# Patient Record
Sex: MALE | Race: BLACK OR AFRICAN AMERICAN | NOT HISPANIC OR LATINO | URBAN - METROPOLITAN AREA
[De-identification: names, ages, dates, MRNs, and addresses within clinical notes are randomized per-mention and may not be internally consistent; named-entity substitution may affect disease eponyms.]

---

## 2024-03-29 ENCOUNTER — EMERGENCY (EMERGENCY)
Facility: HOSPITAL | Age: 59
LOS: 1 days | Discharge: HOME | End: 2024-03-29
Attending: STUDENT IN AN ORGANIZED HEALTH CARE EDUCATION/TRAINING PROGRAM | Admitting: STUDENT IN AN ORGANIZED HEALTH CARE EDUCATION/TRAINING PROGRAM
Payer: SELF-PAY

## 2024-03-29 VITALS
TEMPERATURE: 98 F | DIASTOLIC BLOOD PRESSURE: 74 MMHG | OXYGEN SATURATION: 98 % | SYSTOLIC BLOOD PRESSURE: 137 MMHG | RESPIRATION RATE: 18 BRPM | HEART RATE: 84 BPM

## 2024-03-29 PROCEDURE — 99284 EMERGENCY DEPT VISIT MOD MDM: CPT | Mod: 25

## 2024-03-29 PROCEDURE — 99284 EMERGENCY DEPT VISIT MOD MDM: CPT

## 2024-03-29 PROCEDURE — 71045 X-RAY EXAM CHEST 1 VIEW: CPT

## 2024-03-29 PROCEDURE — 71045 X-RAY EXAM CHEST 1 VIEW: CPT | Mod: 26

## 2024-03-29 PROCEDURE — 96372 THER/PROPH/DIAG INJ SC/IM: CPT

## 2024-03-29 RX ORDER — DIPHENHYDRAMINE HCL 50 MG
50 CAPSULE ORAL ONCE
Refills: 0 | Status: DISCONTINUED | OUTPATIENT
Start: 2024-03-29 | End: 2024-03-29

## 2024-03-29 RX ORDER — DIPHENHYDRAMINE HCL 50 MG
50 CAPSULE ORAL ONCE
Refills: 0 | Status: COMPLETED | OUTPATIENT
Start: 2024-03-29 | End: 2024-03-29

## 2024-03-29 RX ORDER — HYDROMORPHONE HYDROCHLORIDE 2 MG/ML
4 INJECTION INTRAMUSCULAR; INTRAVENOUS; SUBCUTANEOUS ONCE
Refills: 0 | Status: DISCONTINUED | OUTPATIENT
Start: 2024-03-29 | End: 2024-03-29

## 2024-03-29 RX ADMIN — HYDROMORPHONE HYDROCHLORIDE 4 MILLIGRAM(S): 2 INJECTION INTRAMUSCULAR; INTRAVENOUS; SUBCUTANEOUS at 15:24

## 2024-03-29 RX ADMIN — Medication 50 MILLIGRAM(S): at 15:45

## 2024-03-29 NOTE — ED PROVIDER NOTE - OBJECTIVE STATEMENT
59 year old male with history of sickle cell disease, lung CA?, presenting with diffuse body pain. States that he is having a sickle cell flare, was unable to fill his home PO dilaudid due to "money issues", [facility] but is visiting his aunt in [facility] currently and could not tolerate the pain. Denies fevers, chills, sob, night sweats, unintentional weight loss. Also recently punched a wall with his R hand, has swelling and pain there but denies numbness, weakness, wound.

## 2024-03-29 NOTE — ED ADULT NURSE NOTE - OBJECTIVE STATEMENT
60 y/o male c/o generalized body pain. stated " he has sickle cell and has pain everywhere, requesting Dilaudid and benadryl " also stated " his uncle has TB and he needs to be checked for" denies fever, chills, cough, night sweats. also requesting, lotion, cream for his lips, band-aids, Alchohol pads, crackers and water.

## 2024-03-29 NOTE — ED PROVIDER NOTE - PATIENT PORTAL LINK FT
You can access the FollowMyHealth Patient Portal offered by [facility] by registering at the following website: [email or web address]. By joining [employer]’s FollowMyHealth portal, you will also be able to view your health information using other applications (apps) compatible with our system.

## 2024-03-29 NOTE — ED PROVIDER NOTE - NSFOLLOWUPINSTRUCTIONS_ED_ALL_ED_FT
You were seen in the Emergency Department for: chest pain    Please follow up with your primary physician. If you do not have a primary physician or specialist of your needs, please call [phone] to find one convenient for you. At this number you will be able to locate a provider who accepts your insurance, as well as locate the right specialist for your needs.    You should return to the Emergency Department if you feel any new/worsening/persistent symptoms including but not limited to: fever, chills, vomiting, chest pain, difficulty breathing, loss of consciousness, bleeding, uncontrolled pain, numbness/weakness of a body part You were seen in the Emergency Department for: sickle cell pain    Please follow up with your primary physician. If you do not have a primary physician or specialist of your needs, please call [phone] to find one convenient for you. At this number you will be able to locate a provider who accepts your insurance, as well as locate the right specialist for your needs.    You should return to the Emergency Department if you feel any new/worsening/persistent symptoms including but not limited to: fever, chills, vomiting, chest pain, difficulty breathing, loss of consciousness, bleeding, uncontrolled pain, numbness/weakness of a body part

## 2024-03-29 NOTE — ED PROVIDER NOTE - CLINICAL SUMMARY MEDICAL DECISION MAKING FREE TEXT BOX
59 year old male with history of sickle cell disease, lung CA?, presenting with diffuse body pain 2/2 sickle cell vasoocclusive pain crisis. Well appearing overall here with good vitals/afebrile. Despite patient's report of TB contact--low suspicion for TB--does not have any constitutional symptoms, night sweats, weight loss, fever. Will obtain CXR to further assess. Patient declines XR imaging of his R hand despite recent trauma and pain/swelling noted on exam. EKG is nsr nonischemic, low suspicion for ACS. Patient presenting with prescription for PO 4mg dilaudid--was unable to fill due to lack of money. Requesting IM dose as his pain is severe at this moment due to VOC. Will give one time dose, obtain CXR to assess for infection, otherwise DC [facility]. No indication for labs/inpatient management.

## 2024-03-29 NOTE — ED ADULT TRIAGE NOTE - CHIEF COMPLAINT QUOTE
Patient PMH cardiac stents x 2 to the ED c/o chest pain with cough x 2 hours, "I have been exposed to TB, I want a test". AAOx4, NAD. Patient PMH cardiac stents x 2 to the ED c/o chest pain with cough x 2 hours, "I have been exposed to TB, I want a test". Patient also c/o right hand pain, refusing to state DUNG. Unable to visualize patient's face due to refusing to take off facial cover/sunglasses. AAOx4, NAD.

## 2024-03-29 NOTE — ED ADULT NURSE NOTE - CHIEF COMPLAINT QUOTE
Patient PMH cardiac stents x 2 to the ED c/o chest pain with cough x 2 hours, "I have been exposed to TB, I want a test". Patient also c/o right hand pain, refusing to state DUNG. Unable to visualize patient's face due to refusing to take off facial cover/sunglasses. AAOx4, NAD.

## 2024-03-29 NOTE — ED PROVIDER NOTE - PHYSICAL EXAMINATION
Gen - NAD; well-appearing; A+Ox3   HEENT - NCAT, EOMI, moist mucous membranes, clear oropharynx  Neck - supple  Resp - CTAB, no increased WOB  CV -  RRR, no m/r/g  Abd - soft, NT, ND; no guarding or rebound  Back - no midline, paraspinous, or CVA tenderness  MSK - FROM of b/l UE and LE, no gross deformities, swelling to R dorsal knuckles but FROM, NVI distally throughout, soft compartments, no wound  Extrem - no LE edema/erythema/tenderness  Neuro - no focal motor or sensation deficits  Skin - warm, well perfused

## 2024-03-31 DIAGNOSIS — R52 PAIN, UNSPECIFIED: ICD-10-CM

## 2024-03-31 DIAGNOSIS — D57.00 HB-SS DISEASE WITH CRISIS, UNSPECIFIED: ICD-10-CM
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
## 2024-03-31 DIAGNOSIS — M79.641 PAIN IN RIGHT HAND: ICD-10-CM

## 2024-03-31 DIAGNOSIS — Z88.8 ALLERGY STATUS TO OTHER DRUGS, MEDICAMENTS AND BIOLOGICAL SUBSTANCES: ICD-10-CM
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
## 2024-03-31 DIAGNOSIS — Z88.6 ALLERGY STATUS TO ANALGESIC AGENT: ICD-10-CM

## 2024-03-31 DIAGNOSIS — M79.89 OTHER SPECIFIED SOFT TISSUE DISORDERS: ICD-10-CM

## 2024-04-01 ENCOUNTER — EMERGENCY (EMERGENCY)
Facility: HOSPITAL | Age: 59
LOS: 1 days | Discharge: HOME | End: 2024-04-01
Attending: EMERGENCY MEDICINE | Admitting: EMERGENCY MEDICINE
Payer: SELF-PAY

## 2024-04-01 VITALS
TEMPERATURE: 98 F | RESPIRATION RATE: 18 BRPM | HEIGHT: 74 IN | HEART RATE: 100 BPM | DIASTOLIC BLOOD PRESSURE: 78 MMHG | SYSTOLIC BLOOD PRESSURE: 148 MMHG | WEIGHT: 186.07 LBS | OXYGEN SATURATION: 98 %

## 2024-04-01 VITALS
TEMPERATURE: 98 F | SYSTOLIC BLOOD PRESSURE: 140 MMHG | OXYGEN SATURATION: 99 % | DIASTOLIC BLOOD PRESSURE: 74 MMHG | RESPIRATION RATE: 18 BRPM | HEART RATE: 89 BPM

## 2024-04-01 PROCEDURE — 71045 X-RAY EXAM CHEST 1 VIEW: CPT

## 2024-04-01 PROCEDURE — 93005 ELECTROCARDIOGRAM TRACING: CPT

## 2024-04-01 PROCEDURE — 93010 ELECTROCARDIOGRAM REPORT: CPT

## 2024-04-01 PROCEDURE — 99283 EMERGENCY DEPT VISIT LOW MDM: CPT | Mod: 25

## 2024-04-01 PROCEDURE — 96372 THER/PROPH/DIAG INJ SC/IM: CPT

## 2024-04-01 PROCEDURE — 71045 X-RAY EXAM CHEST 1 VIEW: CPT | Mod: 26

## 2024-04-01 PROCEDURE — 99285 EMERGENCY DEPT VISIT HI MDM: CPT

## 2024-04-01 PROCEDURE — 99053 MED SERV 10PM-8AM 24 HR FAC: CPT

## 2024-04-01 RX ORDER — ONDANSETRON 8 MG/1
4 TABLET, FILM COATED ORAL ONCE
Refills: 0 | Status: COMPLETED | OUTPATIENT
Start: 2024-04-01 | End: 2024-04-01

## 2024-04-01 RX ORDER — DIPHENHYDRAMINE HCL 50 MG
50 CAPSULE ORAL ONCE
Refills: 0 | Status: COMPLETED | OUTPATIENT
Start: 2024-04-01 | End: 2024-04-01

## 2024-04-01 RX ORDER — HYDROMORPHONE HYDROCHLORIDE 2 MG/ML
4 INJECTION INTRAMUSCULAR; INTRAVENOUS; SUBCUTANEOUS ONCE
Refills: 0 | Status: DISCONTINUED | OUTPATIENT
Start: 2024-04-01 | End: 2024-04-01

## 2024-04-01 RX ORDER — FAMOTIDINE 10 MG/ML
20 INJECTION INTRAVENOUS ONCE
Refills: 0 | Status: DISCONTINUED | OUTPATIENT
Start: 2024-04-01 | End: 2024-04-01

## 2024-04-01 RX ADMIN — HYDROMORPHONE HYDROCHLORIDE 4 MILLIGRAM(S): 2 INJECTION INTRAMUSCULAR; INTRAVENOUS; SUBCUTANEOUS at 23:19

## 2024-04-01 RX ADMIN — Medication 50 MILLIGRAM(S): at 23:24

## 2024-04-01 RX ADMIN — ONDANSETRON 4 MILLIGRAM(S): 8 TABLET, FILM COATED ORAL at 23:19

## 2024-04-01 NOTE — ED PROVIDER NOTE - CHIEF COMPLAINT
The patient is a 59y Male complaining of chest pain. Glycopyrrolate Counseling:  I discussed with the patient the risks of glycopyrrolate including but not limited to skin rash, drowsiness, dry mouth, difficulty urinating, and blurred vision.

## 2024-04-01 NOTE — ED PROVIDER NOTE - CLINICAL SUMMARY MEDICAL DECISION MAKING FREE TEXT BOX
59-year-old male with drug-seeking behavior and sickle cell disease will give patient his pain medication and DC patient does not want evaluation lab work discussed case with patient and advised that he can return at any time should he develop chest pain shortness of breath or hemoptysis.      EKG normal sinus rhythm 95 normal axis intervals LVH no STEMI   chest x-ray without pneumothorax or pneumonia no acute chest

## 2024-04-01 NOTE — ED PROVIDER NOTE - NSFOLLOWUPCLINICS_GEN_ALL_ED_FT
[facility]  Family Medicine  [address]  [place], [place] [address]  Phone: [phone]  Fax:

## 2024-04-01 NOTE — ED ADULT NURSE NOTE - OBJECTIVE STATEMENT
58 y/o M 60 y/o M  endorses hx of sickle cell disease, lung CA. Pt refusing some questions from RN, refusing IV access. Pt requesting Dilaudid and Benadryl for his sickle cell pain. At this time pt denies chest pain, jaw pain, arm pain, SOB, N/V/D, palpitations, dizziness, headache, lightheadedness. PT A&Ox4, respirations even and unlabored, skin color WDL warm and dry, pt is ambulatory with a steady gait. No acute distress observed. 60 y/o M  endorses hx of sickle cell disease, lung CA. Pt refusing some questions from RN, refusing IV access, refusing blood draw. Pt requesting Dilaudid and Benadryl for his sickle cell pain. At this time pt denies chest pain, jaw pain, arm pain, SOB, N/V/D, palpitations, dizziness, headache, lightheadedness. PT A&Ox4, respirations even and unlabored, skin color WDL warm and dry, pt is ambulatory with a steady gait. No acute distress observed.

## 2024-04-01 NOTE — ED PROVIDER NOTE - PATIENT PORTAL LINK FT
You can access the FollowMyHealth Patient Portal offered by [facility] by registering at the following website: [email or web address]. By joining [employer]’s FollowMyHealth portal, you will also be able to view your health information using other applications (apps) compatible with our system.

## 2024-04-01 NOTE — ED PROVIDER NOTE - OBJECTIVE STATEMENT
59-year-old male self-described sickle cell disease and lung CA has multiple MRN's and names which he gives a different ED throughout the [facility] area here for sickle cell pain body aches initially triaged as chest pain but patient denies chest pain.  I have seen this patient in multiple ED's on multiple occasions and previously was feigning significant injury to get Dilaudid and drug-seeking behavior.  Today patient is states that he needs his Dilaudid and oral Benadryl and  does not want any further workup.  Denies associated chest pain or shortness of breath.  Requesting pain meds and DC.

## 2024-04-01 NOTE — ED ADULT NURSE NOTE - NSFALLUNIVINTERV_ED_ALL_ED
Bed/Stretcher in lowest position, wheels locked, appropriate side rails in place/Call bell, personal items and telephone in reach/Instruct patient to call for assistance before getting out of bed/chair/stretcher/Non-slip footwear applied when patient is off stretcher/[place] to call system/Physically safe environment - no spills, clutter or unnecessary equipment/Purposeful proactive rounding/Room/bathroom lighting operational, light cord in reach

## 2024-04-03 DIAGNOSIS — R52 PAIN, UNSPECIFIED: ICD-10-CM

## 2024-04-03 DIAGNOSIS — Z88.6 ALLERGY STATUS TO ANALGESIC AGENT: ICD-10-CM

## 2024-04-03 DIAGNOSIS — D57.00 HB-SS DISEASE WITH CRISIS, UNSPECIFIED: ICD-10-CM

## 2024-04-03 DIAGNOSIS — Z88.8 ALLERGY STATUS TO OTHER DRUGS, MEDICAMENTS AND BIOLOGICAL SUBSTANCES: ICD-10-CM

## 2024-04-06 ENCOUNTER — EMERGENCY (EMERGENCY)
Facility: HOSPITAL | Age: 59
LOS: 1 days | Discharge: LEFT BEFORE DISCHARGE | End: 2024-04-06
Attending: STUDENT IN AN ORGANIZED HEALTH CARE EDUCATION/TRAINING PROGRAM | Admitting: STUDENT IN AN ORGANIZED HEALTH CARE EDUCATION/TRAINING PROGRAM
Payer: SELF-PAY

## 2024-04-06 VITALS
OXYGEN SATURATION: 100 % | DIASTOLIC BLOOD PRESSURE: 76 MMHG | WEIGHT: 179.9 LBS | TEMPERATURE: 98 F | RESPIRATION RATE: 17 BRPM | HEIGHT: 74 IN | SYSTOLIC BLOOD PRESSURE: 135 MMHG | HEART RATE: 93 BPM

## 2024-04-06 DIAGNOSIS — R07.89 OTHER CHEST PAIN: ICD-10-CM

## 2024-04-06 DIAGNOSIS — Z88.5 ALLERGY STATUS TO NARCOTIC AGENT: ICD-10-CM

## 2024-04-06 DIAGNOSIS — Z53.29 PROCEDURE AND TREATMENT NOT CARRIED OUT BECAUSE OF PATIENT'S DECISION FOR OTHER REASONS: ICD-10-CM

## 2024-04-06 DIAGNOSIS — Z88.8 ALLERGY STATUS TO OTHER DRUGS, MEDICAMENTS AND BIOLOGICAL SUBSTANCES: ICD-10-CM

## 2024-04-06 DIAGNOSIS — Z88.6 ALLERGY STATUS TO ANALGESIC AGENT: ICD-10-CM

## 2024-04-06 PROCEDURE — 99284 EMERGENCY DEPT VISIT MOD MDM: CPT

## 2024-04-06 PROCEDURE — 99282 EMERGENCY DEPT VISIT SF MDM: CPT

## 2024-04-06 NOTE — ED PROVIDER NOTE - OBJECTIVE STATEMENT
59 year old M presenting requesting IV dilaudid. Pt states he has chest pain and thinks it is because he needs IV dilaudid. States it is not related to his heart and he just needs IV dilaudid to help with the pain. Denies any sob. No lightheadedness or dizziness. Discussed with patient that more history is needed and blood work to rule out cardiac etiology, pt states he does not want any blood work and just needs dilaudid. Patient walked out of ER prior to full evaluation.

## 2024-04-06 NOTE — ED ADULT NURSE NOTE - CHIEF COMPLAINT QUOTE
59M PMH MI (+plavix) presents to ED c/o midsternal CP radiating to L jaw associated with mid back pain x2-3 hours and migraine with light sensitivity x4 hours. denies SOB, n/v or weakness/dizziness. EKG in progress. pt speaking in clear, complete sentences. RR easy, even, un-labored on room air. A&Ox4, ambulatory with steady gait using assistive device.

## 2024-04-06 NOTE — ED ADULT NURSE NOTE - OBJECTIVE STATEMENT
59 y.o. Male A/ox4 c/o midsternal chest pain radiating to jaw x4 hours. Requesting Dilaudid medication on arrival. "my chest pain is from my sickle cell". when doctor said he would like to r/o any cardiac emergencies, pt stated [facility] Walked out of ER with steady gait independently. Denies SOB. unlabored even respirations no acute distress. patient educated on importance of ruling out emergencies and risk of leaving without proper assessmnet, pt verbalized understanding, refused care, and left ED. 59 y.o. Male A/ox4 c/o midsternal chest pain radiating to jaw x4 hours. Requesting Dilaudid medication on arrival. "my chest pain is from my sickle cell". when doctor said he would like to r/o any cardiac emergencies, pt stated "[facility]." Walked out of ER with steady gait independently. Denies SOB. unlabored even respirations no acute distress. patient educated on importance of ruling out emergencies and risk of leaving without proper assessmnet, pt verbalized understanding, refused care, and left ED.

## 2024-04-06 NOTE — ED PROVIDER NOTE - PHYSICAL EXAMINATION
general: Well appearing, in no acute distress  HEENT: Normocephalic, atraumatic, extraocular movements intact  CV: Regular rate  Pulm: No respiratory distress, no tachypnea  Abd: Flat, no gross distension  Skin: No gross rashes or lesions  Neuro: Alert and oriented, moving all extremities, ambulating with steady gait

## 2024-04-06 NOTE — ED PROVIDER NOTE - CLINICAL SUMMARY MEDICAL DECISION MAKING FREE TEXT BOX
58 yo M p/w cp, ekg nsr, no stemi. patient refused blood work including trop for acs ro, pt walked out.

## 2024-04-06 NOTE — ED PROVIDER NOTE - NS ED ROS FT
Constitutional: No fever or chills  Eyes: No discharge or drainage  Ears, Nose, Mouth, Throat: No nasal discharge, no sore throat  Cardiovascular: No chest pain, no palpitations  Respiratory: No shortness of breath, no cough  Gastrointestinal: No nausea or vomiting, no abdominal pain, no diarrhea or constipation  Musculoskeletal: No joint pain, no swelling  Skin: No rashes or lesions

## 2024-04-06 NOTE — ED ADULT NURSE NOTE - NSFALLRISKINTERV_ED_ALL_ED

## 2024-04-06 NOTE — ED ADULT TRIAGE NOTE - CHIEF COMPLAINT QUOTE
59M PMH MI (+plavix) presents to ED c/o midsternal CP radiating to L jaw associated with mid back pain x2-3 hours and migraine with light sensitivity x4 hours. denies CP, n/v or weakness/dizziness. EKG in progress. pt speaking in clear, complete sentences. RR easy, even, un-labored on room air. A&Ox4, ambulatory with steady gait using assistive device. 59M PMH MI (+plavix) presents to ED c/o midsternal CP radiating to L jaw associated with mid back pain x2-3 hours and migraine with light sensitivity x4 hours. denies SOB, n/v or weakness/dizziness. EKG in progress. pt speaking in clear, complete sentences. RR easy, even, un-labored on room air. A&Ox4, ambulatory with steady gait using assistive device.

## 2024-04-13 ENCOUNTER — EMERGENCY (EMERGENCY)
Facility: HOSPITAL | Age: 59
LOS: 1 days | Discharge: HOME | End: 2024-04-13
Attending: EMERGENCY MEDICINE | Admitting: EMERGENCY MEDICINE
Payer: SELF-PAY

## 2024-04-13 VITALS
RESPIRATION RATE: 22 BRPM | HEIGHT: 74 IN | OXYGEN SATURATION: 98 % | HEART RATE: 111 BPM | DIASTOLIC BLOOD PRESSURE: 70 MMHG | TEMPERATURE: 100 F | SYSTOLIC BLOOD PRESSURE: 132 MMHG

## 2024-04-13 DIAGNOSIS — Z76.5 MALINGERER [CONSCIOUS SIMULATION]: ICD-10-CM

## 2024-04-13 DIAGNOSIS — R07.81 PLEURODYNIA: ICD-10-CM
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
## 2024-04-13 DIAGNOSIS — Z88.5 ALLERGY STATUS TO NARCOTIC AGENT: ICD-10-CM
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
## 2024-04-13 DIAGNOSIS — Z88.8 ALLERGY STATUS TO OTHER DRUGS, MEDICAMENTS AND BIOLOGICAL SUBSTANCES: ICD-10-CM

## 2024-04-13 DIAGNOSIS — Z88.6 ALLERGY STATUS TO ANALGESIC AGENT: ICD-10-CM

## 2024-04-13 PROCEDURE — 99282 EMERGENCY DEPT VISIT SF MDM: CPT

## 2024-04-13 PROCEDURE — 99283 EMERGENCY DEPT VISIT LOW MDM: CPT

## 2024-04-13 PROCEDURE — 99053 MED SERV 10PM-8AM 24 HR FAC: CPT

## 2024-04-13 NOTE — ED ADULT NURSE NOTE - NSFALLUNIVINTERV_ED_ALL_ED
Bed/Stretcher in lowest position, wheels locked, appropriate side rails in place/Call bell, personal items and telephone in reach/Instruct patient to call for assistance before getting out of bed/chair/stretcher/Non-slip footwear applied when patient is off stretcher/[place] to call system/Physically safe environment - no spills, clutter or unnecessary equipment/Purposeful proactive rounding/Room/bathroom lighting operational, light cord in reach

## 2024-04-13 NOTE — ED ADULT TRIAGE NOTE - ARRIVAL INFO ADDITIONAL COMMENTS
pt states he fell down a flight of stairs in the train [place].    c/o head, neck, back, chest and leg pain.   states he thinks he is a trauma.

## 2024-04-13 NOTE — ED PROVIDER NOTE - CLINICAL SUMMARY MEDICAL DECISION MAKING FREE TEXT BOX
px states he fell down 15 stairs- there is no physical injury on the patient- ho similar malingering- stable gait normal vitals normal exam- no indication for work up in the ED px states he fell down 15 stairs- there is no physical injury on the patient- ho similar malingering- stable gait normal vitals normal exam- no indication for work up in the ED  px physical exam- incompatible with story- not a single scratch on patient- similar prior visits seeking narcotics- will not botello patient given  completely normal exam- slightly elev hr at 102 but no splinting, nml wob no ind for botello

## 2024-04-13 NOTE — ED PROVIDER NOTE - OBJECTIVE STATEMENT
59 M [place]- [place] pain to R ribs, pain with talking  no meds/blood thinners

## 2024-04-13 NOTE — ED PROVIDER NOTE - PATIENT PORTAL LINK FT
You can access the FollowMyHealth Patient Portal offered by [facility] by registering at the following website: [email or web address]. By joining [employer]’s FollowMyHealth portal, you will also be able to view your health information using other applications (apps) compatible with our system.

## 2024-04-13 NOTE — ED ADULT NURSE NOTE - OBJECTIVE STATEMENT
Received via wheelchair from walk in triage with chief complaints of SP falling down the stairs tonight. Pt states "I'm a trauma case. I have to be seen first. I [place]." pt denies hitting head, LOC, blood thinner use.     Patient AOX4, speaking full sentences. Resps even and nonlabored. Moves all extremities. No obvious trauma/injury/deformity noted. Patient oriented to ED area. All needs attended. POC reviewed. Purposeful proactive hourly rounding in progress.
